# Patient Record
Sex: MALE | Race: WHITE | NOT HISPANIC OR LATINO | Employment: FULL TIME | URBAN - METROPOLITAN AREA
[De-identification: names, ages, dates, MRNs, and addresses within clinical notes are randomized per-mention and may not be internally consistent; named-entity substitution may affect disease eponyms.]

---

## 2015-04-28 LAB
BUN SERPL-MCNC: 19 MG/DL (ref 7–18)
CALCIUM SERPL-MCNC: 9.6 MG/DL (ref 8.5–10.1)
CHLORIDE SERPL-SCNC: 102 MMOL/L (ref 98–107)
CO2 SERPL-SCNC: 27 MMOL/L (ref 21–32)
CREAT SERPL-MCNC: 1.13 MG/DL (ref 0.6–1.3)
GLUCOSE SERPL-MCNC: 90 MG/DL (ref 74–106)
POTASSIUM SERPL-SCNC: 4.5 MMOL/L (ref 3.5–5.1)
SODIUM SERPL-SCNC: 137 MEQ/L (ref 131–145)

## 2018-01-12 ENCOUNTER — HISTORICAL (OUTPATIENT)
Dept: LAB | Facility: HOSPITAL | Age: 33
End: 2018-01-12

## 2018-01-12 LAB
HAV IGM SERPL QL IA: NEGATIVE
HBV CORE IGM SERPL QL IA: NEGATIVE
HBV SURFACE AG SERPL QL IA: NEGATIVE
HCV AB SERPL QL IA: NEGATIVE
HEPATITIS PANEL INTERP: NORMAL
HIV 1+2 AB+HIV1 P24 AG SERPL QL IA: NEGATIVE
RPR SER QL: NORMAL

## 2018-04-24 ENCOUNTER — HISTORICAL (OUTPATIENT)
Dept: LAB | Facility: HOSPITAL | Age: 33
End: 2018-04-24

## 2019-12-16 LAB
INFLUENZA A ANTIGEN, POC: NEGATIVE
INFLUENZA B ANTIGEN, POC: NEGATIVE
RAPID GROUP A STREP (OHS): NEGATIVE

## 2020-01-29 LAB
INFLUENZA A ANTIGEN, POC: POSITIVE
INFLUENZA B ANTIGEN, POC: NEGATIVE
RAPID GROUP A STREP (OHS): NEGATIVE

## 2020-10-14 ENCOUNTER — HISTORICAL (OUTPATIENT)
Dept: URGENT CARE | Facility: CLINIC | Age: 35
End: 2020-10-14

## 2021-01-10 LAB — RAPID GROUP A STREP (OHS): NEGATIVE

## 2021-08-13 ENCOUNTER — HISTORICAL (OUTPATIENT)
Dept: ADMINISTRATIVE | Facility: HOSPITAL | Age: 36
End: 2021-08-13

## 2021-08-13 LAB — SARS-COV-2 RNA RESP QL NAA+PROBE: POSITIVE

## 2022-04-10 ENCOUNTER — HISTORICAL (OUTPATIENT)
Dept: ADMINISTRATIVE | Facility: HOSPITAL | Age: 37
End: 2022-04-10

## 2022-04-25 VITALS
WEIGHT: 199.94 LBS | HEIGHT: 67 IN | SYSTOLIC BLOOD PRESSURE: 137 MMHG | DIASTOLIC BLOOD PRESSURE: 95 MMHG | OXYGEN SATURATION: 97 % | BODY MASS INDEX: 31.38 KG/M2

## 2022-08-13 ENCOUNTER — OFFICE VISIT (OUTPATIENT)
Dept: URGENT CARE | Facility: CLINIC | Age: 37
End: 2022-08-13
Payer: COMMERCIAL

## 2022-08-13 VITALS
DIASTOLIC BLOOD PRESSURE: 84 MMHG | SYSTOLIC BLOOD PRESSURE: 125 MMHG | WEIGHT: 220 LBS | BODY MASS INDEX: 34.53 KG/M2 | OXYGEN SATURATION: 97 % | HEART RATE: 91 BPM | TEMPERATURE: 99 F | HEIGHT: 67 IN | RESPIRATION RATE: 18 BRPM

## 2022-08-13 DIAGNOSIS — Z76.0 ENCOUNTER FOR MEDICATION REFILL: ICD-10-CM

## 2022-08-13 DIAGNOSIS — H10.9 CONJUNCTIVITIS, UNSPECIFIED CONJUNCTIVITIS TYPE, UNSPECIFIED LATERALITY: Primary | ICD-10-CM

## 2022-08-13 PROCEDURE — 1160F PR REVIEW ALL MEDS BY PRESCRIBER/CLIN PHARMACIST DOCUMENTED: ICD-10-PCS | Mod: CPTII,,, | Performed by: FAMILY MEDICINE

## 2022-08-13 PROCEDURE — 1160F RVW MEDS BY RX/DR IN RCRD: CPT | Mod: CPTII,,, | Performed by: FAMILY MEDICINE

## 2022-08-13 PROCEDURE — 3079F PR MOST RECENT DIASTOLIC BLOOD PRESSURE 80-89 MM HG: ICD-10-PCS | Mod: CPTII,,, | Performed by: FAMILY MEDICINE

## 2022-08-13 PROCEDURE — 1159F PR MEDICATION LIST DOCUMENTED IN MEDICAL RECORD: ICD-10-PCS | Mod: CPTII,,, | Performed by: FAMILY MEDICINE

## 2022-08-13 PROCEDURE — 3008F BODY MASS INDEX DOCD: CPT | Mod: CPTII,,, | Performed by: FAMILY MEDICINE

## 2022-08-13 PROCEDURE — 99212 PR OFFICE/OUTPT VISIT, EST, LEVL II, 10-19 MIN: ICD-10-PCS | Mod: ,,, | Performed by: FAMILY MEDICINE

## 2022-08-13 PROCEDURE — 1159F MED LIST DOCD IN RCRD: CPT | Mod: CPTII,,, | Performed by: FAMILY MEDICINE

## 2022-08-13 PROCEDURE — 3074F SYST BP LT 130 MM HG: CPT | Mod: CPTII,,, | Performed by: FAMILY MEDICINE

## 2022-08-13 PROCEDURE — 99212 OFFICE O/P EST SF 10 MIN: CPT | Mod: ,,, | Performed by: FAMILY MEDICINE

## 2022-08-13 PROCEDURE — 3008F PR BODY MASS INDEX (BMI) DOCUMENTED: ICD-10-PCS | Mod: CPTII,,, | Performed by: FAMILY MEDICINE

## 2022-08-13 PROCEDURE — 3079F DIAST BP 80-89 MM HG: CPT | Mod: CPTII,,, | Performed by: FAMILY MEDICINE

## 2022-08-13 PROCEDURE — 3074F PR MOST RECENT SYSTOLIC BLOOD PRESSURE < 130 MM HG: ICD-10-PCS | Mod: CPTII,,, | Performed by: FAMILY MEDICINE

## 2022-08-13 RX ORDER — OFLOXACIN 3 MG/ML
2 SOLUTION/ DROPS OPHTHALMIC 4 TIMES DAILY
Qty: 5 ML | Refills: 0 | Status: SHIPPED | OUTPATIENT
Start: 2022-08-13 | End: 2022-08-20

## 2022-08-13 RX ORDER — ACYCLOVIR 400 MG/1
TABLET ORAL
Qty: 50 TABLET | Refills: 5 | Status: SHIPPED | OUTPATIENT
Start: 2022-08-13 | End: 2024-02-08 | Stop reason: ALTCHOICE

## 2022-08-13 NOTE — PATIENT INSTRUCTIONS
Medications sent to pharmacy.  Wash your hands before and after using the eye drops. If Symptoms persist or you develop changes in your vision, sharp or severe eye pain, or sensitivity to light, return to clinic or seek medical attention immediately

## 2022-08-13 NOTE — PROGRESS NOTES
"Subjective:       Patient ID: Dhuet Lalito is a 36 y.o. male.    Vitals:  height is 5' 7" (1.702 m) and weight is 99.8 kg (220 lb). His oral temperature is 98.5 °F (36.9 °C). His blood pressure is 125/84 and his pulse is 91. His respiration is 18 and oxygen saturation is 97%.     Chief Complaint: Eye Pain (Left x 3 days ago eye drops no relief ) and Medication Refill    36-year-old male presents to clinic complaining of left eye redness and irritation.  States that it is matted shut in the mornings.  Patient did a floor seen eye exam already, there was no corneal abrasion or ulcers.  Patient does not wear contact lenses.  Patient denies any changes in vision, sensitivity to light, ocular pain with movement, sharp or severe eye ball pain.  Patient was also requesting acyclovir for cold sores.        Eye Pain   The left eye is affected. This is a new problem. Episode onset: x 3 days ago. There was no injury mechanism. The pain is at a severity of 2/10. The pain is mild. Associated symptoms include an eye discharge and eye redness. Pertinent negatives include no photophobia. Treatments tried: eye drops* The treatment provided no relief.   Medication Refill        Constitution: Negative.   HENT: Negative.    Neck: neck negative.   Cardiovascular: Negative.    Eyes: Positive for eye discharge and eye redness. Negative for eye trauma, foreign body in eye, photophobia and vision loss.   Respiratory: Negative.    Gastrointestinal: Negative.    Genitourinary: Negative.    Musculoskeletal: Negative.    Skin: Negative.    Allergic/Immunologic: Negative.    Neurological: Negative.    Hematologic/Lymphatic: Negative.        Objective:      Physical Exam   Constitutional: He is oriented to person, place, and time.   HENT:   Head: Normocephalic and atraumatic.   Eyes: Pupils are equal, round, and reactive to light. Left eye exhibits discharge (Pupil equal round reactive light accommodation.  Extraocular muscles intact.  There is " "erythema of the conjunctiva.). Extraocular movement intact   Pulmonary/Chest: Effort normal.   Abdominal: Normal appearance.   Neurological: He is alert and oriented to person, place, and time.   Psychiatric: His behavior is normal. Mood, judgment and thought content normal.   Vitals reviewed.          Previous History      Review of patient's allergies indicates:  No Known Allergies    Past Medical History:   Diagnosis Date    HSV (herpes simplex virus) infection      Current Outpatient Medications   Medication Instructions    acyclovir (ZOVIRAX) 400 MG tablet One tab po 5x/day x 5 days prn cold sore    ofloxacin (OCUFLOX) 0.3 % ophthalmic solution 2 drops, Left Eye, 4 times daily     History reviewed. No pertinent surgical history.  History reviewed. No pertinent family history.    Social History     Tobacco Use    Smoking status: Never Smoker    Smokeless tobacco: Never Used   Substance Use Topics    Alcohol use: Not Currently        Physical Exam      Vital Signs Reviewed   /84 (BP Location: Left arm, Patient Position: Sitting)   Pulse 91   Temp 98.5 °F (36.9 °C) (Oral)   Resp 18   Ht 5' 7" (1.702 m)   Wt 99.8 kg (220 lb)   SpO2 97%   BMI 34.46 kg/m²        Procedures    Procedures     Labs     Results for orders placed or performed in visit on 04/24/18   HIV 1/2 Ag/Ab (4th Gen)   Result Value Ref Range    HIV Negative >Negative   Hepatitis Panel, Acute   Result Value Ref Range    Hepatitis B Surface Antigen Negative >Negative    Hepatitis C Antibody Negative >Negative    Hepatitis A IgM Negative >Negative    Hepatitis B Core IgM Negative >Negative         Assessment:       1. Conjunctivitis, unspecified conjunctivitis type, unspecified laterality    2. Encounter for medication refill          Plan:       Medications sent to pharmacy.  Wash your hands before and after using the eye drops. If Symptoms persist or you develop changes in your vision, sharp or severe eye pain, or sensitivity to " light, return to clinic or seek medical attention immediately  Conjunctivitis, unspecified conjunctivitis type, unspecified laterality    Encounter for medication refill    Other orders  -     ofloxacin (OCUFLOX) 0.3 % ophthalmic solution; Place 2 drops into the left eye 4 (four) times daily. for 7 days  Dispense: 5 mL; Refill: 0  -     acyclovir (ZOVIRAX) 400 MG tablet; One tab po 5x/day x 5 days prn cold sore  Dispense: 50 tablet; Refill: 5

## 2022-09-18 ENCOUNTER — HISTORICAL (OUTPATIENT)
Dept: ADMINISTRATIVE | Facility: HOSPITAL | Age: 37
End: 2022-09-18
Payer: COMMERCIAL

## 2022-09-19 ENCOUNTER — HISTORICAL (OUTPATIENT)
Dept: ADMINISTRATIVE | Facility: HOSPITAL | Age: 37
End: 2022-09-19
Payer: COMMERCIAL

## 2022-09-21 ENCOUNTER — HISTORICAL (OUTPATIENT)
Dept: ADMINISTRATIVE | Facility: HOSPITAL | Age: 37
End: 2022-09-21
Payer: COMMERCIAL

## 2024-02-08 PROBLEM — Z00.00 WELLNESS EXAMINATION: Status: ACTIVE | Noted: 2024-02-08

## 2024-02-08 PROBLEM — A60.00 GENITAL HERPES SIMPLEX: Status: ACTIVE | Noted: 2024-02-08

## 2024-02-08 PROBLEM — R21 RASH: Status: ACTIVE | Noted: 2024-02-08

## 2024-02-08 PROBLEM — F41.9 ANXIETY: Status: ACTIVE | Noted: 2024-02-08

## 2024-05-13 PROBLEM — Z00.00 WELLNESS EXAMINATION: Status: RESOLVED | Noted: 2024-02-08 | Resolved: 2024-05-13

## 2024-08-08 PROCEDURE — 87389 HIV-1 AG W/HIV-1&-2 AB AG IA: CPT | Performed by: STUDENT IN AN ORGANIZED HEALTH CARE EDUCATION/TRAINING PROGRAM

## 2024-08-08 PROCEDURE — 86704 HEP B CORE ANTIBODY TOTAL: CPT | Performed by: STUDENT IN AN ORGANIZED HEALTH CARE EDUCATION/TRAINING PROGRAM

## 2024-08-08 PROCEDURE — 86780 TREPONEMA PALLIDUM: CPT | Performed by: STUDENT IN AN ORGANIZED HEALTH CARE EDUCATION/TRAINING PROGRAM

## 2024-08-08 PROCEDURE — 86706 HEP B SURFACE ANTIBODY: CPT | Performed by: STUDENT IN AN ORGANIZED HEALTH CARE EDUCATION/TRAINING PROGRAM

## 2024-08-08 PROCEDURE — 87591 N.GONORRHOEAE DNA AMP PROB: CPT | Performed by: STUDENT IN AN ORGANIZED HEALTH CARE EDUCATION/TRAINING PROGRAM

## 2024-08-08 PROCEDURE — 87661 TRICHOMONAS VAGINALIS AMPLIF: CPT | Performed by: STUDENT IN AN ORGANIZED HEALTH CARE EDUCATION/TRAINING PROGRAM

## 2024-08-08 PROCEDURE — 87491 CHLMYD TRACH DNA AMP PROBE: CPT | Performed by: STUDENT IN AN ORGANIZED HEALTH CARE EDUCATION/TRAINING PROGRAM

## 2024-08-08 PROCEDURE — 87340 HEPATITIS B SURFACE AG IA: CPT | Performed by: STUDENT IN AN ORGANIZED HEALTH CARE EDUCATION/TRAINING PROGRAM

## 2024-08-08 PROCEDURE — 86803 HEPATITIS C AB TEST: CPT | Performed by: STUDENT IN AN ORGANIZED HEALTH CARE EDUCATION/TRAINING PROGRAM

## 2024-08-12 PROBLEM — E78.5 MILD HYPERLIPIDEMIA: Status: ACTIVE | Noted: 2024-08-12

## 2024-10-14 ENCOUNTER — OFFICE VISIT (OUTPATIENT)
Dept: URGENT CARE | Facility: CLINIC | Age: 39
End: 2024-10-14
Payer: COMMERCIAL

## 2024-10-14 VITALS
WEIGHT: 195 LBS | OXYGEN SATURATION: 99 % | TEMPERATURE: 98 F | HEIGHT: 67 IN | SYSTOLIC BLOOD PRESSURE: 134 MMHG | DIASTOLIC BLOOD PRESSURE: 82 MMHG | BODY MASS INDEX: 30.61 KG/M2 | HEART RATE: 65 BPM | RESPIRATION RATE: 18 BRPM

## 2024-10-14 DIAGNOSIS — M79.644 PAIN OF RIGHT THUMB: ICD-10-CM

## 2024-10-14 DIAGNOSIS — S62.501A CLOSED AVULSION FRACTURE OF PHALANX OF RIGHT THUMB, INITIAL ENCOUNTER: Primary | ICD-10-CM

## 2024-10-14 PROCEDURE — 99214 OFFICE O/P EST MOD 30 MIN: CPT | Mod: ,,, | Performed by: FAMILY MEDICINE

## 2024-10-14 NOTE — PATIENT INSTRUCTIONS
Plan:   X-ray:  There are bony fragments seen on the volar aspect at the interphalangeal joint  You have been referred to orthopedics.  They will call you for an appointment.  Leave the splint on until you are seen by orthopedics  Tylenol or Motrin as needed  Ice the area through 4 times a day for 5-10 minutes as needed  Contact this clinic with any concerns

## 2024-10-14 NOTE — PROGRESS NOTES
"Subjective:      Patient ID: Lalito Soria is a 39 y.o. male.    Vitals:  height is 5' 7" (1.702 m) and weight is 88.5 kg (195 lb). His temperature is 98 °F (36.7 °C). His blood pressure is 134/82 and his pulse is 65. His respiration is 18 and oxygen saturation is 99%.     Chief Complaint: Hand Pain     Patient is a 39 y.o. male who presents to urgent care with complaints of right thumb pain, states he was playing soccer 2 weeks ago and a ball hit thumb .  Thinks it hyperextended the thumb.  States there was a lot of swelling and bruising on the palmar aspect of the thumb.  Thought it would improve on its own.  States the swelling has continued and he is unable to flex the thumb.  Still with pain as well. Alleviating factors include Aleve, ice compressions with mild amount of relief. Patient denies any past injury to thumb.      Hand Pain       Constitution: Negative.   HENT: Negative.     Neck: neck negative.   Cardiovascular: Negative.    Eyes: Negative.    Respiratory: Negative.     Gastrointestinal: Negative.    Genitourinary: Negative.    Musculoskeletal:  Positive for joint pain.   Skin: Negative.    Allergic/Immunologic: Negative.    Neurological: Negative.    Hematologic/Lymphatic: Negative.       Objective:     Physical Exam   Constitutional: He is oriented to person, place, and time.  Non-toxic appearance. He does not appear ill. No distress.   HENT:   Head: Normocephalic and atraumatic.   Eyes: Conjunctivae are normal.   Abdominal: Normal appearance.   Musculoskeletal:         General: Swelling (right thumb) and tenderness (right thumb.  Unable to flex right thumb) present.   Neurological: He is alert and oriented to person, place, and time.   Skin: Skin is not diaphoretic. bruising   Psychiatric: His behavior is normal. Mood, judgment and thought content normal.   Vitals reviewed.         Previous History      Review of patient's allergies indicates:   Allergen Reactions    Suprax [cefixime] Rash       Past " "Medical History:   Diagnosis Date    Anxiety     Depression     HSV (herpes simplex virus) infection     HSV-1 infection     HSV-2 (herpes simplex virus 2) infection      Current Outpatient Medications   Medication Instructions    clobetasoL (TEMOVATE) 0.05 % cream Topical (Top), 2 times daily, For 2 to 4 weeks, then as needed only    valACYclovir (VALTREX) 500 mg, Oral, Daily     Past Surgical History:   Procedure Laterality Date    TONSILLECTOMY      WISDOM TOOTH EXTRACTION       Family History   Problem Relation Name Age of Onset    Hypothyroidism Mother      Cirrhosis Father          non-alcoholic    Lymphoma Father          PML    Diabetes type II Father         Social History     Tobacco Use    Smoking status: Never    Smokeless tobacco: Never   Substance Use Topics    Alcohol use: Yes     Comment: 3 times a week    Drug use: Never        Physical Exam      Vital Signs Reviewed   /82   Pulse 65   Temp 98 °F (36.7 °C)   Resp 18   Ht 5' 7" (1.702 m)   Wt 88.5 kg (195 lb)   SpO2 99%   BMI 30.54 kg/m²        Procedures    Procedures     Labs     Results for orders placed or performed in visit on 08/08/24   Chlamydia/GC, PCR    Collection Time: 08/08/24  8:39 AM    Specimen: Urine   Result Value Ref Range    Chlamydia trachomatis PCR Not Detected Not Detected    N. gonorrhea PCR Not Detected Not Detected    Source Urine    Trichomonas vaginalis Amplified RNA    Collection Time: 08/08/24  8:39 AM    Specimen: Urine   Result Value Ref Range    SOURCE: urine     Trichomonas vaginalis amplified RNA Negative Negative   HIV 1/2 Ag/Ab (4th Gen)    Collection Time: 08/08/24  8:39 AM   Result Value Ref Range    HIV Nonreactive Nonreactive   SYPHILIS ANTIBODY (WITH REFLEX RPR)    Collection Time: 08/08/24  8:39 AM   Result Value Ref Range    Syphilis Antibody Nonreactive Nonreactive, Equivocal   Hepatitis C Antibody    Collection Time: 08/08/24  8:39 AM   Result Value Ref Range    Hep C Ab Interp Nonreactive " Nonreactive   Hepatitis B Surface Antibody, Qual/Quant    Collection Time: 08/08/24  8:39 AM   Result Value Ref Range    HBs Antibody Negative     HBs Antibody, Quantitative <3.5 mIU/mL   Hepatitis B Surface Antigen    Collection Time: 08/08/24  8:39 AM   Result Value Ref Range    Hep BsAg Interp Nonreactive Nonreactive   Basic Metabolic Panel    Collection Time: 08/08/24  8:39 AM   Result Value Ref Range    Sodium 141 136 - 145 mmol/L    Potassium 4.9 3.5 - 5.1 mmol/L    Chloride 105 98 - 107 mmol/L    CO2 27 21 - 32 mmol/L    Glucose 93 74 - 106 mg/dL    Blood Urea Nitrogen 29 (H) 7.0 - 18.0 mg/dL    Creatinine 1.25 0.60 - 1.30 mg/dL    BUN/Creatinine Ratio 23     Calcium 10.2 (H) 8.5 - 10.1 mg/dL    eGFR >60 mL/min/1.73/m2   Hepatitis B Core Antibody, Total    Collection Time: 08/08/24  8:39 AM   Result Value Ref Range    Hep BcAb Interp Nonreactive Nonreactive       Assessment:     1. Closed avulsion fracture of phalanx of right thumb, initial encounter    2. Pain of right thumb        Plan:   X-ray:  There are bony fragments seen on the volar aspect at the interphalangeal joint  You have been referred to orthopedics.  They will call you for an appointment.  Leave the splint on until you are seen by orthopedics  Tylenol or Motrin as needed  Ice the area through 4 times a day for 5-10 minutes as needed  Contact this clinic with any concerns    Closed avulsion fracture of phalanx of right thumb, initial encounter  -     Ambulatory referral/consult to Orthopedics    Pain of right thumb  -     XR FINGER 2 OR MORE VIEWS; Future; Expected date: 10/14/2024  -     E - OTHER

## 2025-01-09 ENCOUNTER — CLINICAL SUPPORT (OUTPATIENT)
Dept: URGENT CARE | Facility: CLINIC | Age: 40
End: 2025-01-09
Payer: COMMERCIAL

## 2025-01-09 VITALS — TEMPERATURE: 97 F

## 2025-01-09 DIAGNOSIS — Z23 FLU VACCINE NEED: Primary | ICD-10-CM

## 2025-01-09 NOTE — PROGRESS NOTES
Subjective:      Patient ID: Lalito Soria is a 39 y.o. male.    Vitals:  vitals were not taken for this visit.     Chief Complaint: No chief complaint on file.    HPI  ROS   Objective:     Physical Exam    Assessment:     1. Flu vaccine need        Plan:       Flu vaccine need  -     influenza (Flulaval, Fluzone, Fluarix) 45 mcg/0.5 mL IM vaccine (> or = 6 mo) 0.5 mL

## 2025-01-09 NOTE — PROGRESS NOTES
Patient presented to clinic for influenza vaccination. Vaccine information sheet given to patient along with written consent form. After written consent was obtained, patient given influenza vaccine in right deltoid. Patient tolerated vaccine well.